# Patient Record
Sex: FEMALE | Race: WHITE | Employment: OTHER | URBAN - METROPOLITAN AREA
[De-identification: names, ages, dates, MRNs, and addresses within clinical notes are randomized per-mention and may not be internally consistent; named-entity substitution may affect disease eponyms.]

---

## 2019-12-02 PROCEDURE — 88305 TISSUE EXAM BY PATHOLOGIST: CPT | Performed by: INTERNAL MEDICINE

## 2024-12-20 DIAGNOSIS — M48.062 SPINAL STENOSIS OF LUMBAR REGION WITH NEUROGENIC CLAUDICATION: ICD-10-CM

## 2024-12-20 DIAGNOSIS — Z01.812 ENCOUNTER FOR PRE-OPERATIVE LABORATORY TESTING: ICD-10-CM

## 2024-12-20 DIAGNOSIS — Z01.818 PRE-OP TESTING: Primary | ICD-10-CM

## 2024-12-26 RX ORDER — ROSUVASTATIN CALCIUM 20 MG/1
20 TABLET, COATED ORAL NIGHTLY
COMMUNITY

## 2024-12-26 RX ORDER — PREDNISONE 20 MG/1
20 TABLET ORAL DAILY
COMMUNITY
Start: 2024-10-18 | End: 2025-01-17

## 2024-12-30 ENCOUNTER — TELEPHONE (OUTPATIENT)
Dept: FAMILY MEDICINE CLINIC | Facility: CLINIC | Age: 69
End: 2024-12-30

## 2024-12-30 DIAGNOSIS — Z01.818 PREOP EXAMINATION: Primary | ICD-10-CM

## 2024-12-30 DIAGNOSIS — Z01.812 PRE-OPERATIVE LABORATORY EXAMINATION: ICD-10-CM

## 2024-12-30 NOTE — TELEPHONE ENCOUNTER
Patient had H&P done with Dr. Swanson 12/20/24.  EKG also done 12/20 and will be scanned in [facility]. UA in labs done 12/20/24.    She is coming in 12/31/24 to get labs and MRSA swab, and chest x-ray done.    Labs- Alk Phos (53), all other labs WNL, MRSA neg  Xray- WNL    Sent to Dr. Swanson on [facility] to review.

## 2024-12-31 ENCOUNTER — LAB ENCOUNTER (OUTPATIENT)
Dept: LAB | Facility: HOSPITAL | Age: 69
End: 2024-12-31
Attending: INTERNAL MEDICINE
Payer: MEDICARE

## 2024-12-31 ENCOUNTER — HOSPITAL ENCOUNTER (OUTPATIENT)
Dept: GENERAL RADIOLOGY | Facility: HOSPITAL | Age: 69
Discharge: HOME | End: 2024-12-31
Attending: INTERNAL MEDICINE
Payer: MEDICARE

## 2024-12-31 DIAGNOSIS — Z01.818 PRE-OP TESTING: ICD-10-CM

## 2024-12-31 DIAGNOSIS — Z01.812 PRE-OPERATIVE LABORATORY EXAMINATION: ICD-10-CM

## 2024-12-31 DIAGNOSIS — Z01.812 ENCOUNTER FOR PRE-OPERATIVE LABORATORY TESTING: ICD-10-CM

## 2024-12-31 DIAGNOSIS — Z01.818 PREOP EXAMINATION: ICD-10-CM

## 2024-12-31 DIAGNOSIS — M48.062 SPINAL STENOSIS OF LUMBAR REGION WITH NEUROGENIC CLAUDICATION: ICD-10-CM

## 2024-12-31 LAB
ALBUMIN SERPL-MCNC: 4.4 G/DL (ref 3.2–4.8)
ALBUMIN/GLOB SERPL: 1.5 {RATIO} (ref 1–2)
ALP LIVER SERPL-CCNC: 53 U/L
ALT SERPL-CCNC: 16 U/L
ANION GAP SERPL CALC-SCNC: 4 MMOL/L (ref 0–18)
ANTIBODY SCREEN: NEGATIVE
AST SERPL-CCNC: 20 U/L (ref ?–34)
BASOPHILS # BLD AUTO: 0.06 X10(3) UL (ref 0–0.2)
BASOPHILS NFR BLD AUTO: 1.1 %
BILIRUB SERPL-MCNC: 0.6 MG/DL (ref 0.2–1.1)
BUN BLD-MCNC: 13 MG/DL (ref 9–23)
BUN/CREAT SERPL: 15.7 (ref 10–20)
CALCIUM BLD-MCNC: 9.7 MG/DL (ref 8.7–10.4)
CHLORIDE SERPL-SCNC: 103 MMOL/L (ref 98–112)
CO2 SERPL-SCNC: 29 MMOL/L (ref 21–32)
CREAT BLD-MCNC: 0.83 MG/DL
CRP SERPL-MCNC: <0.4 MG/DL (ref ?–1)
DEPRECATED RDW RBC AUTO: 42 FL (ref 35.1–46.3)
EGFRCR SERPLBLD CKD-EPI 2021: 76 ML/MIN/1.73M2 (ref 60–?)
EOSINOPHIL # BLD AUTO: 0.18 X10(3) UL (ref 0–0.7)
EOSINOPHIL NFR BLD AUTO: 3.2 %
ERYTHROCYTE [DISTWIDTH] IN BLOOD BY AUTOMATED COUNT: 12 % (ref 11–15)
ERYTHROCYTE [SEDIMENTATION RATE] IN BLOOD: 8 MM/HR
FASTING STATUS PATIENT QL REPORTED: YES
GLOBULIN PLAS-MCNC: 3 G/DL (ref 2–3.5)
GLUCOSE BLD-MCNC: 89 MG/DL (ref 70–99)
HCT VFR BLD AUTO: 38.9 %
HGB BLD-MCNC: 13.3 G/DL
IMM GRANULOCYTES # BLD AUTO: 0.09 X10(3) UL (ref 0–1)
IMM GRANULOCYTES NFR BLD: 1.6 %
LYMPHOCYTES # BLD AUTO: 2.16 X10(3) UL (ref 1–4)
LYMPHOCYTES NFR BLD AUTO: 38.8 %
MCH RBC QN AUTO: 32.4 PG (ref 26–34)
MCHC RBC AUTO-ENTMCNC: 34.2 G/DL (ref 31–37)
MCV RBC AUTO: 94.6 FL
MONOCYTES # BLD AUTO: 0.43 X10(3) UL (ref 0.1–1)
MONOCYTES NFR BLD AUTO: 7.7 %
MRSA DNA SPEC QL NAA+PROBE: NEGATIVE
NEUTROPHILS # BLD AUTO: 2.65 X10 (3) UL (ref 1.5–7.7)
NEUTROPHILS # BLD AUTO: 2.65 X10(3) UL (ref 1.5–7.7)
NEUTROPHILS NFR BLD AUTO: 47.6 %
OSMOLALITY SERPL CALC.SUM OF ELEC: 282 MOSM/KG (ref 275–295)
PLATELET # BLD AUTO: 337 10(3)UL (ref 150–450)
POTASSIUM SERPL-SCNC: 4.3 MMOL/L (ref 3.5–5.1)
PROT SERPL-MCNC: 7.4 G/DL (ref 5.7–8.2)
RBC # BLD AUTO: 4.11 X10(6)UL
RH BLOOD TYPE: POSITIVE
SODIUM SERPL-SCNC: 136 MMOL/L (ref 136–145)
WBC # BLD AUTO: 5.6 X10(3) UL (ref 4–11)

## 2024-12-31 PROCEDURE — 85025 COMPLETE CBC W/AUTO DIFF WBC: CPT

## 2024-12-31 PROCEDURE — 86140 C-REACTIVE PROTEIN: CPT

## 2024-12-31 PROCEDURE — 87641 MR-STAPH DNA AMP PROBE: CPT

## 2024-12-31 PROCEDURE — 85652 RBC SED RATE AUTOMATED: CPT

## 2024-12-31 PROCEDURE — 86901 BLOOD TYPING SEROLOGIC RH(D): CPT | Performed by: INTERNAL MEDICINE

## 2024-12-31 PROCEDURE — 71046 X-RAY EXAM CHEST 2 VIEWS: CPT | Performed by: INTERNAL MEDICINE

## 2024-12-31 PROCEDURE — 80053 COMPREHEN METABOLIC PANEL: CPT

## 2024-12-31 PROCEDURE — 86850 RBC ANTIBODY SCREEN: CPT | Performed by: INTERNAL MEDICINE

## 2024-12-31 PROCEDURE — 86900 BLOOD TYPING SEROLOGIC ABO: CPT | Performed by: INTERNAL MEDICINE

## 2024-12-31 PROCEDURE — 36415 COLL VENOUS BLD VENIPUNCTURE: CPT

## 2025-01-03 NOTE — TELEPHONE ENCOUNTER
Andre Swanson MD  You3 days ago       EKG normal 12/20,  CBC,CMP, Sed rate, CRP normal 12/31    Patient is an acceptable surgical candidate    Andre Swanson MD

## 2025-01-03 NOTE — TELEPHONE ENCOUNTER
PARRISH for patient with test results and let her know she is clear for surgery per Dr. Swanson. Dr. Awad's office notified.

## 2025-01-16 ENCOUNTER — APPOINTMENT (OUTPATIENT)
Dept: GENERAL RADIOLOGY | Facility: HOSPITAL | Age: 70
End: 2025-01-16
Attending: ORTHOPAEDIC SURGERY
Payer: MEDICARE

## 2025-01-16 ENCOUNTER — ANESTHESIA EVENT (OUTPATIENT)
Dept: SURGERY | Facility: HOSPITAL | Age: 70
End: 2025-01-16
Payer: MEDICARE

## 2025-01-16 ENCOUNTER — HOSPITAL ENCOUNTER (INPATIENT)
Facility: HOSPITAL | Age: 70
LOS: 1 days | Discharge: HOME | End: 2025-01-17
Attending: ORTHOPAEDIC SURGERY | Admitting: ORTHOPAEDIC SURGERY
Payer: MEDICARE

## 2025-01-16 ENCOUNTER — ANESTHESIA (OUTPATIENT)
Dept: SURGERY | Facility: HOSPITAL | Age: 70
End: 2025-01-16
Payer: MEDICARE

## 2025-01-16 PROBLEM — E78.5 HYPERLIPIDEMIA: Status: ACTIVE | Noted: 2025-01-16

## 2025-01-16 PROBLEM — Z98.1 S/P LUMBAR FUSION: Status: ACTIVE | Noted: 2025-01-16

## 2025-01-16 PROBLEM — M54.16 LUMBAR RADICULOPATHY: Status: ACTIVE | Noted: 2025-01-16

## 2025-01-16 LAB — RH BLOOD TYPE: POSITIVE

## 2025-01-16 PROCEDURE — 0SG00A0 FUSION OF LUMBAR VERTEBRAL JOINT WITH INTERBODY FUSION DEVICE, ANTERIOR APPROACH, ANTERIOR COLUMN, OPEN APPROACH: ICD-10-PCS | Performed by: ORTHOPAEDIC SURGERY

## 2025-01-16 PROCEDURE — 0SB20ZZ EXCISION OF LUMBAR VERTEBRAL DISC, OPEN APPROACH: ICD-10-PCS | Performed by: ORTHOPAEDIC SURGERY

## 2025-01-16 PROCEDURE — 99222 1ST HOSP IP/OBS MODERATE 55: CPT | Performed by: HOSPITALIST

## 2025-01-16 PROCEDURE — 4A11X4G MONITORING OF PERIPHERAL NERVOUS ELECTRICAL ACTIVITY, INTRAOPERATIVE, EXTERNAL APPROACH: ICD-10-PCS | Performed by: ORTHOPAEDIC SURGERY

## 2025-01-16 PROCEDURE — 76000 FLUOROSCOPY <1 HR PHYS/QHP: CPT | Performed by: ORTHOPAEDIC SURGERY

## 2025-01-16 PROCEDURE — 0SG0071 FUSION OF LUMBAR VERTEBRAL JOINT WITH AUTOLOGOUS TISSUE SUBSTITUTE, POSTERIOR APPROACH, POSTERIOR COLUMN, OPEN APPROACH: ICD-10-PCS | Performed by: ORTHOPAEDIC SURGERY

## 2025-01-16 DEVICE — XLIF AMS PLATE, 8MM SINGLE-SIDED
Type: IMPLANTABLE DEVICE | Site: BACK | Status: FUNCTIONAL
Brand: XLIF

## 2025-01-16 DEVICE — SCREW SPNL 6.5X45MM 2C REDUC POLYAX RELINE: Type: IMPLANTABLE DEVICE | Site: BACK | Status: FUNCTIONAL

## 2025-01-16 DEVICE — GRAFT BNE SUB MTRX C OSTEOCEL PRO: Type: IMPLANTABLE DEVICE | Site: BACK | Status: FUNCTIONAL

## 2025-01-16 DEVICE — K WIRE FIX NIT BVL BLNT TIP PRECEPT: Type: IMPLANTABLE DEVICE | Site: BACK

## 2025-01-16 DEVICE — IMPLANTABLE DEVICE: Type: IMPLANTABLE DEVICE | Site: BACK | Status: FUNCTIONAL

## 2025-01-16 DEVICE — SCREW SPNL 5.5MM LOK OPN TULIP RELINE: Type: IMPLANTABLE DEVICE | Site: BACK | Status: FUNCTIONAL

## 2025-01-16 DEVICE — ROD SPNL 5.5X45MM POST: Type: IMPLANTABLE DEVICE | Site: BACK | Status: FUNCTIONAL

## 2025-01-16 DEVICE — COHERE XL, 8X18X50MM 10°
Type: IMPLANTABLE DEVICE | Site: BACK | Status: FUNCTIONAL
Brand: COHERE

## 2025-01-16 RX ORDER — BISACODYL 10 MG
10 SUPPOSITORY, RECTAL RECTAL
Status: DISCONTINUED | OUTPATIENT
Start: 2025-01-16 | End: 2025-01-17

## 2025-01-16 RX ORDER — MORPHINE SULFATE 4 MG/ML
4 INJECTION, SOLUTION INTRAMUSCULAR; INTRAVENOUS EVERY 10 MIN PRN
Status: DISCONTINUED | OUTPATIENT
Start: 2025-01-16 | End: 2025-01-16 | Stop reason: HOSPADM

## 2025-01-16 RX ORDER — SODIUM CHLORIDE, SODIUM LACTATE, POTASSIUM CHLORIDE, CALCIUM CHLORIDE 600; 310; 30; 20 MG/100ML; MG/100ML; MG/100ML; MG/100ML
INJECTION, SOLUTION INTRAVENOUS CONTINUOUS PRN
Status: DISCONTINUED | OUTPATIENT
Start: 2025-01-16 | End: 2025-01-16 | Stop reason: SURG

## 2025-01-16 RX ORDER — HYDROMORPHONE HYDROCHLORIDE 1 MG/ML
0.4 INJECTION, SOLUTION INTRAMUSCULAR; INTRAVENOUS; SUBCUTANEOUS EVERY 5 MIN PRN
Status: DISCONTINUED | OUTPATIENT
Start: 2025-01-16 | End: 2025-01-16 | Stop reason: HOSPADM

## 2025-01-16 RX ORDER — SODIUM CHLORIDE, SODIUM LACTATE, POTASSIUM CHLORIDE, CALCIUM CHLORIDE 600; 310; 30; 20 MG/100ML; MG/100ML; MG/100ML; MG/100ML
INJECTION, SOLUTION INTRAVENOUS CONTINUOUS
Status: DISCONTINUED | OUTPATIENT
Start: 2025-01-16 | End: 2025-01-16 | Stop reason: HOSPADM

## 2025-01-16 RX ORDER — SODIUM CHLORIDE, SODIUM LACTATE, POTASSIUM CHLORIDE, CALCIUM CHLORIDE 600; 310; 30; 20 MG/100ML; MG/100ML; MG/100ML; MG/100ML
INJECTION, SOLUTION INTRAVENOUS CONTINUOUS
Status: DISCONTINUED | OUTPATIENT
Start: 2025-01-16 | End: 2025-01-17

## 2025-01-16 RX ORDER — MIDAZOLAM HYDROCHLORIDE 1 MG/ML
INJECTION INTRAMUSCULAR; INTRAVENOUS AS NEEDED
Status: DISCONTINUED | OUTPATIENT
Start: 2025-01-16 | End: 2025-01-16 | Stop reason: SURG

## 2025-01-16 RX ORDER — HYDROMORPHONE HYDROCHLORIDE 1 MG/ML
0.6 INJECTION, SOLUTION INTRAMUSCULAR; INTRAVENOUS; SUBCUTANEOUS EVERY 5 MIN PRN
Status: DISCONTINUED | OUTPATIENT
Start: 2025-01-16 | End: 2025-01-16 | Stop reason: HOSPADM

## 2025-01-16 RX ORDER — EPHEDRINE SULFATE 50 MG/ML
INJECTION INTRAVENOUS AS NEEDED
Status: DISCONTINUED | OUTPATIENT
Start: 2025-01-16 | End: 2025-01-16 | Stop reason: SURG

## 2025-01-16 RX ORDER — ROSUVASTATIN CALCIUM 20 MG/1
20 TABLET, COATED ORAL NIGHTLY
Status: DISCONTINUED | OUTPATIENT
Start: 2025-01-16 | End: 2025-01-17

## 2025-01-16 RX ORDER — SCOPOLAMINE 1 MG/3D
PATCH, EXTENDED RELEASE TRANSDERMAL AS NEEDED
Status: DISCONTINUED | OUTPATIENT
Start: 2025-01-16 | End: 2025-01-16 | Stop reason: SURG

## 2025-01-16 RX ORDER — HYDROMORPHONE HYDROCHLORIDE 1 MG/ML
0.2 INJECTION, SOLUTION INTRAMUSCULAR; INTRAVENOUS; SUBCUTANEOUS EVERY 2 HOUR PRN
Status: DISCONTINUED | OUTPATIENT
Start: 2025-01-16 | End: 2025-01-17

## 2025-01-16 RX ORDER — DOCUSATE SODIUM 100 MG/1
100 CAPSULE, LIQUID FILLED ORAL 2 TIMES DAILY
Status: DISCONTINUED | OUTPATIENT
Start: 2025-01-16 | End: 2025-01-17

## 2025-01-16 RX ORDER — METOCLOPRAMIDE HYDROCHLORIDE 5 MG/ML
10 INJECTION INTRAMUSCULAR; INTRAVENOUS EVERY 8 HOURS PRN
Status: DISCONTINUED | OUTPATIENT
Start: 2025-01-16 | End: 2025-01-17

## 2025-01-16 RX ORDER — HYDROMORPHONE HYDROCHLORIDE 1 MG/ML
0.2 INJECTION, SOLUTION INTRAMUSCULAR; INTRAVENOUS; SUBCUTANEOUS EVERY 5 MIN PRN
Status: DISCONTINUED | OUTPATIENT
Start: 2025-01-16 | End: 2025-01-16 | Stop reason: HOSPADM

## 2025-01-16 RX ORDER — OXYCODONE HYDROCHLORIDE 5 MG/1
2.5 TABLET ORAL EVERY 4 HOURS PRN
Status: DISCONTINUED | OUTPATIENT
Start: 2025-01-16 | End: 2025-01-17

## 2025-01-16 RX ORDER — NALOXONE HYDROCHLORIDE 0.4 MG/ML
80 INJECTION, SOLUTION INTRAMUSCULAR; INTRAVENOUS; SUBCUTANEOUS AS NEEDED
Status: DISCONTINUED | OUTPATIENT
Start: 2025-01-16 | End: 2025-01-16 | Stop reason: HOSPADM

## 2025-01-16 RX ORDER — POLYETHYLENE GLYCOL 3350 17 G/17G
17 POWDER, FOR SOLUTION ORAL DAILY PRN
Status: DISCONTINUED | OUTPATIENT
Start: 2025-01-16 | End: 2025-01-17

## 2025-01-16 RX ORDER — ROCURONIUM BROMIDE 10 MG/ML
INJECTION, SOLUTION INTRAVENOUS AS NEEDED
Status: DISCONTINUED | OUTPATIENT
Start: 2025-01-16 | End: 2025-01-16 | Stop reason: SURG

## 2025-01-16 RX ORDER — METOCLOPRAMIDE HYDROCHLORIDE 5 MG/ML
10 INJECTION INTRAMUSCULAR; INTRAVENOUS EVERY 8 HOURS PRN
Status: DISCONTINUED | OUTPATIENT
Start: 2025-01-16 | End: 2025-01-16 | Stop reason: HOSPADM

## 2025-01-16 RX ORDER — DIPHENHYDRAMINE HYDROCHLORIDE 50 MG/ML
25 INJECTION INTRAMUSCULAR; INTRAVENOUS EVERY 4 HOURS PRN
Status: DISCONTINUED | OUTPATIENT
Start: 2025-01-16 | End: 2025-01-17

## 2025-01-16 RX ORDER — DIAZEPAM 2 MG/1
2 TABLET ORAL EVERY 6 HOURS PRN
Status: DISCONTINUED | OUTPATIENT
Start: 2025-01-16 | End: 2025-01-17

## 2025-01-16 RX ORDER — MORPHINE SULFATE 10 MG/ML
6 INJECTION, SOLUTION INTRAMUSCULAR; INTRAVENOUS EVERY 10 MIN PRN
Status: DISCONTINUED | OUTPATIENT
Start: 2025-01-16 | End: 2025-01-16 | Stop reason: HOSPADM

## 2025-01-16 RX ORDER — DIPHENHYDRAMINE HCL 25 MG
25 CAPSULE ORAL EVERY 4 HOURS PRN
Status: DISCONTINUED | OUTPATIENT
Start: 2025-01-16 | End: 2025-01-17

## 2025-01-16 RX ORDER — ONDANSETRON 2 MG/ML
INJECTION INTRAMUSCULAR; INTRAVENOUS AS NEEDED
Status: DISCONTINUED | OUTPATIENT
Start: 2025-01-16 | End: 2025-01-16 | Stop reason: SURG

## 2025-01-16 RX ORDER — SODIUM PHOSPHATE, DIBASIC AND SODIUM PHOSPHATE, MONOBASIC 7; 19 G/230ML; G/230ML
1 ENEMA RECTAL ONCE AS NEEDED
Status: DISCONTINUED | OUTPATIENT
Start: 2025-01-16 | End: 2025-01-17

## 2025-01-16 RX ORDER — FAMOTIDINE 20 MG/1
20 TABLET, FILM COATED ORAL NIGHTLY PRN
Status: DISCONTINUED | OUTPATIENT
Start: 2025-01-16 | End: 2025-01-17

## 2025-01-16 RX ORDER — SENNOSIDES 8.6 MG
17.2 TABLET ORAL NIGHTLY
Status: DISCONTINUED | OUTPATIENT
Start: 2025-01-16 | End: 2025-01-17

## 2025-01-16 RX ORDER — ACETAMINOPHEN 500 MG
1000 TABLET ORAL ONCE
Status: COMPLETED | OUTPATIENT
Start: 2025-01-16 | End: 2025-01-16

## 2025-01-16 RX ORDER — ONDANSETRON 2 MG/ML
4 INJECTION INTRAMUSCULAR; INTRAVENOUS EVERY 6 HOURS PRN
Status: DISCONTINUED | OUTPATIENT
Start: 2025-01-16 | End: 2025-01-16 | Stop reason: HOSPADM

## 2025-01-16 RX ORDER — HYDROMORPHONE HYDROCHLORIDE 1 MG/ML
0.4 INJECTION, SOLUTION INTRAMUSCULAR; INTRAVENOUS; SUBCUTANEOUS EVERY 2 HOUR PRN
Status: DISCONTINUED | OUTPATIENT
Start: 2025-01-16 | End: 2025-01-17

## 2025-01-16 RX ORDER — METHOCARBAMOL 100 MG/ML
1000 INJECTION, SOLUTION INTRAMUSCULAR; INTRAVENOUS ONCE
Status: COMPLETED | OUTPATIENT
Start: 2025-01-16 | End: 2025-01-16

## 2025-01-16 RX ORDER — DEXAMETHASONE SODIUM PHOSPHATE 4 MG/ML
VIAL (ML) INJECTION AS NEEDED
Status: DISCONTINUED | OUTPATIENT
Start: 2025-01-16 | End: 2025-01-16 | Stop reason: SURG

## 2025-01-16 RX ORDER — MORPHINE SULFATE 4 MG/ML
2 INJECTION, SOLUTION INTRAMUSCULAR; INTRAVENOUS EVERY 10 MIN PRN
Status: DISCONTINUED | OUTPATIENT
Start: 2025-01-16 | End: 2025-01-16 | Stop reason: HOSPADM

## 2025-01-16 RX ORDER — LIDOCAINE HYDROCHLORIDE 10 MG/ML
INJECTION, SOLUTION EPIDURAL; INFILTRATION; INTRACAUDAL; PERINEURAL AS NEEDED
Status: DISCONTINUED | OUTPATIENT
Start: 2025-01-16 | End: 2025-01-16 | Stop reason: SURG

## 2025-01-16 RX ORDER — ONDANSETRON 2 MG/ML
4 INJECTION INTRAMUSCULAR; INTRAVENOUS EVERY 6 HOURS PRN
Status: DISCONTINUED | OUTPATIENT
Start: 2025-01-16 | End: 2025-01-17

## 2025-01-16 RX ORDER — PHENYLEPHRINE HCL 10 MG/ML
VIAL (ML) INJECTION AS NEEDED
Status: DISCONTINUED | OUTPATIENT
Start: 2025-01-16 | End: 2025-01-16 | Stop reason: SURG

## 2025-01-16 RX ORDER — BUPIVACAINE HYDROCHLORIDE AND EPINEPHRINE 5; 5 MG/ML; UG/ML
INJECTION, SOLUTION PERINEURAL AS NEEDED
Status: DISCONTINUED | OUTPATIENT
Start: 2025-01-16 | End: 2025-01-16 | Stop reason: HOSPADM

## 2025-01-16 RX ORDER — OXYCODONE HYDROCHLORIDE 5 MG/1
5 TABLET ORAL EVERY 4 HOURS PRN
Status: DISCONTINUED | OUTPATIENT
Start: 2025-01-16 | End: 2025-01-17

## 2025-01-16 RX ADMIN — ROCURONIUM BROMIDE 10 MG: 10 INJECTION, SOLUTION INTRAVENOUS at 12:21:00

## 2025-01-16 RX ADMIN — LIDOCAINE HYDROCHLORIDE 50 MG: 10 INJECTION, SOLUTION EPIDURAL; INFILTRATION; INTRACAUDAL; PERINEURAL at 12:21:00

## 2025-01-16 RX ADMIN — MIDAZOLAM HYDROCHLORIDE 2 MG: 1 INJECTION INTRAMUSCULAR; INTRAVENOUS at 11:56:00

## 2025-01-16 RX ADMIN — ONDANSETRON 4 MG: 2 INJECTION INTRAMUSCULAR; INTRAVENOUS at 12:32:00

## 2025-01-16 RX ADMIN — SODIUM CHLORIDE, SODIUM LACTATE, POTASSIUM CHLORIDE, CALCIUM CHLORIDE: 600; 310; 30; 20 INJECTION, SOLUTION INTRAVENOUS at 12:45:00

## 2025-01-16 RX ADMIN — MIDAZOLAM HYDROCHLORIDE 2 MG: 1 INJECTION INTRAMUSCULAR; INTRAVENOUS at 12:15:00

## 2025-01-16 RX ADMIN — EPHEDRINE SULFATE 10 MG: 50 INJECTION INTRAVENOUS at 14:55:00

## 2025-01-16 RX ADMIN — ONDANSETRON 4 MG: 2 INJECTION INTRAMUSCULAR; INTRAVENOUS at 14:46:00

## 2025-01-16 RX ADMIN — EPHEDRINE SULFATE 10 MG: 50 INJECTION INTRAVENOUS at 13:02:00

## 2025-01-16 RX ADMIN — SCOPOLAMINE 1 PATCH: 1 PATCH, EXTENDED RELEASE TRANSDERMAL at 11:54:00

## 2025-01-16 RX ADMIN — DEXAMETHASONE SODIUM PHOSPHATE 8 MG: 4 MG/ML VIAL (ML) INJECTION at 12:44:00

## 2025-01-16 RX ADMIN — PHENYLEPHRINE HCL 100 MCG: 10 MG/ML VIAL (ML) INJECTION at 13:02:00

## 2025-01-16 RX ADMIN — PHENYLEPHRINE HCL 200 MCG: 10 MG/ML VIAL (ML) INJECTION at 14:55:00

## 2025-01-16 RX ADMIN — SODIUM CHLORIDE, SODIUM LACTATE, POTASSIUM CHLORIDE, CALCIUM CHLORIDE: 600; 310; 30; 20 INJECTION, SOLUTION INTRAVENOUS at 14:51:00

## 2025-01-16 NOTE — ANESTHESIA POSTPROCEDURE EVALUATION
Patient: Kate Franz    Procedure Summary       Date: 01/16/25 Room / Location: [facility] MAIN OR  / [facility] MAIN OR    Anesthesia Start: 1156 Anesthesia Stop: 1527    Procedure: L2 - 3 extreme lateral lumbar interbody fusion with posterior spinal fusion, L2-3 laminectomy (Spine Lumbar) Diagnosis: (Lumbar stenosis, lumbar radiculopathy)    Surgeons: Sedrick Awad MD Anesthesiologist: Rc Becerril MD    Anesthesia Type: general ASA Status: 2            Anesthesia Type: general    Vitals Value Taken Time   /72 01/16/25 1528   Temp 96.9 °F (36.1 °C) 01/16/25 1528   Pulse 85 01/16/25 1528   Resp 16 01/16/25 1528   SpO2 100 % 01/16/25 1528   Vitals shown include unfiled device data.    [facility] AN Post Evaluation:   Patient Evaluated in PACU  Patient Participation: complete - patient participated  Level of Consciousness: awake and responsive to verbal stimuli  Pain Score: 0  Pain Management: adequate  Airway Patency:patent  Yes    Nausea/Vomiting: none  Cardiovascular Status: acceptable, hemodynamically stable and blood pressure returned to baseline  Respiratory Status: acceptable  Postoperative Hydration acceptable      Yoselyn Plaza CRNA  1/16/2025 3:29 PM

## 2025-01-16 NOTE — CONSULTS
[facility]    PATIENT'S NAME: VARSHA HDEZ   ATTENDING PHYSICIAN: Sedrick Awad MD   CONSULTING PHYSICIAN: David Juarez MD   PATIENT ACCOUNT#:   [id number]    LOCATION:  [address] [place] [address]  MEDICAL RECORD #:   [id number]       YOB: 1955  ADMISSION DATE:       01/16/2025      CONSULT DATE:  01/16/2025    REPORT OF CONSULTATION      REASON FOR ADMISSION:  Post lumbar interbody fusion and laminectomy.    HISTORY OF PRESENT ILLNESS:  Patient is a 69-year-old  female with chronic back pain and radiculopathy to the right lower extremity, failed outpatient conservative medical management options.  Known underlying lumbar spondylosis.  She has history of L4-L5 discectomy.  Scheduled today for above-mentioned procedure by her spine orthopedic surgeon, Dr. Awad.  Postoperatively, transferred to PACU for further monitoring.     PAST MEDICAL HISTORY:  Degenerative joint disease of lumbar spine, generalized osteoarthritis.    PAST SURGICAL HISTORY:  Lumbar discectomy at L4-L5, shoulder rotator cuff repair.    MEDICATIONS:  Please see medication reconciliation list.     ALLERGIES:  Etodolac, penicillin, sulfa, Levaquin, and piroxicam.      SOCIAL HISTORY:  No tobacco or drug use.  Social alcohol.  Lives with her family.  Independent for basic activities of daily living.     FAMILY HISTORY:  Father had lung cancer.  Mother had heart disease and hypertension.    REVIEW OF SYSTEMS:  Currently resting in bed.  Back discomfort.  No chest pain.  No shortness of breath.  Other 12-point review of systems is negative.       PHYSICAL EXAMINATION:    GENERAL:  Alert and oriented to time, place and person.  No acute distress.   VITAL SIGNS:  Temperature 96.9, pulse 74, respiratory rate 10, blood pressure 125/73, pulse ox 100% on 2L nasal cannula oxygen.    HEENT:  Atraumatic.  Oropharynx clear.  Moist mucous membranes.  Ears and nose normal.  Eyes:  Anicteric sclerae.   NECK:  Supple.  No  lymphadenopathy.  Trachea midline.  Full range of motion.   LUNGS:  Clear to auscultation bilaterally.  Normal respiratory effort.    HEART:  Regular rate and rhythm.  S1 and S2 auscultated.  No murmur.    ABDOMEN:  Soft, nondistended.  No tenderness.  Positive bowel sounds.   EXTREMITIES:  No peripheral edema, clubbing or cyanosis.   NEUROLOGIC:  Lumbar area dressing noted.    ASSESSMENT AND PLAN:    1.   Lumbar spinal radiculopathy, status post L2-L3 extreme lateral lumbar interbody fusion with posterior spinal fusion, L2-L3 laminectomy.  Pain control.  Neuro checks.  DVT prophylaxis.  Physical and occupational therapy.    2.   Hyperlipidemia.  Continue home medications.    Dictated By David Juarez MD  d: 01/16/2025 16:32:48  t: 01/16/2025 17:10:46  Job [id number]  FB/    cc: Sedrick Awad MD

## 2025-01-16 NOTE — ANESTHESIA PROCEDURE NOTES
Airway  Date/Time: 1/16/2025 12:22 PM  Urgency: Elective      General Information and Staff    Patient location during procedure: OR  Resident/CRNA: Yoselyn Plaza CRNA  Performed: CRNA   Performed by: Yoselyn Plaza CRNA  Authorized by: Rc Becerril MD      Indications and Patient Condition  Indications for airway management: anesthesia  Sedation level: deep  Preoxygenated: yes  Patient position: sniffing  Mask difficulty assessment: 0 - not attempted    Final Airway Details  Final airway type: endotracheal airway      Successful airway: ETT  Cuffed: yes   Successful intubation technique: Video laryngoscopy  Endotracheal tube insertion site: oral  Blade size: #3  ETT size (mm): 7.0    Cormack-Lehane Classification: grade IIA - partial view of glottis  Placement verified by: capnometry   Measured from: lips  ETT to lips (cm): 21  Number of attempts at approach: 1

## 2025-01-16 NOTE — ANESTHESIA PREPROCEDURE EVALUATION
Anesthesia PreOp Note    HPI:     Kate Franz is a 69 year old female who presents for preoperative consultation requested by: Sedrick Awad MD    Date of Surgery: 1/16/2025    Procedure(s):  L2 - 3 extreme lateral lumbar interbody fusion with posterior spinal fusion  Indication: Lumbar stenosis, lumbar radiculopathy    Relevant Problems   No relevant active problems       NPO:  Last Liquid Consumption Date: 01/15/25  Last Liquid Consumption Time: 2100  Last Solid Consumption Date: 01/15/25  Last Solid Consumption Time: 2100  Last Liquid Consumption Date: 01/15/25          History Review:  There are no active problems to display for this patient.      Past Medical History:    Hx of motion sickness    Osteoarthrosis, unspecified whether generalized or localized, unspecified site    PONV (postoperative nausea and vomiting)       Past Surgical History:   Procedure Laterality Date    Back surgery  1991    discectomy L4-5    Colonoscopy,diagnostic  09/26/2015    diffuse diverticulosis, sigmoid polyp    Colonoscopy,remv lesn,snare N/A 09/26/2015    Procedure: COLONOSCOPY, POSSIBLE BIOPSY, POSSIBLE POLYPECTOMY 17165;  Surgeon: Zay Carrasquillo MD;  [facility]    Other surgical history      rotator cuff       Prescriptions Prior to Admission[1]  Current Medications and Prescriptions Ordered in Epic[2]    Allergies[3]    Family History   Problem Relation Age of Onset    Cancer Father         lung    Heart Disorder Mother     Hypertension Mother      Social History     Socioeconomic History    Marital status:    Tobacco Use    Smoking status: Never     Passive exposure: Past    Smokeless tobacco: Never   Vaping Use    Vaping status: Never Used   Substance and Sexual Activity    Alcohol use: Yes     Comment: daily one glass of wine    Drug use: Never       Available pre-op labs reviewed.  Lab Results   Component Value Date    WBC 5.6 12/31/2024    RBC 4.11 12/31/2024    HGB 13.3  12/31/2024    HCT 38.9 12/31/2024    MCV 94.6 12/31/2024    MCH 32.4 12/31/2024    MCHC 34.2 12/31/2024    RDW 12.0 12/31/2024    .0 12/31/2024     Lab Results   Component Value Date     12/31/2024    K 4.3 12/31/2024     12/31/2024    CO2 29.0 12/31/2024    BUN 13 12/31/2024    CREATSERUM 0.83 12/31/2024    GLU 89 12/31/2024    CA 9.7 12/31/2024          Vital Signs:  Body mass index is 23.73 kg/m².   height is 1.676 m (5' 6\") and weight is 66.7 kg (147 lb). Her oral temperature is 97.9 °F (36.6 °C). Her blood pressure is 143/77 and her pulse is 96. Her respiration is 16 and oxygen saturation is 100%.   Vitals:    12/26/24 1019 01/16/25 0959   BP:  143/77   Pulse:  96   Resp:  16   Temp:  97.9 °F (36.6 °C)   TempSrc:  Oral   SpO2:  100%   Weight: 65.8 kg (145 lb) 66.7 kg (147 lb)   Height: 1.676 m (5' 6\")         Anesthesia Evaluation     Patient summary reviewed and Nursing notes reviewed    Airway   Mallampati: II  Dental      Pulmonary - negative ROS and normal exam   Cardiovascular - normal exam  Exercise tolerance: good    NYHA Classification: I    Neuro/Psych - negative ROS     GI/Hepatic/Renal - negative ROS     Endo/Other - negative ROS   Abdominal                  Anesthesia Plan:   ASA:  2  Plan:   General  Airway:  ETT  Post-op Pain Management: IV analgesics      I have informed Kate Vergara Maria M and/or legal guardian or family member of the nature of the anesthetic plan, benefits, risks including possible dental damage if relevant, major complications, and any alternative forms of anesthetic management.   All of the patient's questions were answered to the best of my ability. The patient desires the anesthetic management as planned.  RACHEL PENN MD  1/16/2025 11:37 AM  Present on Admission:  **None**           [1]   Medications Prior to Admission   Medication Sig Dispense Refill Last Dose/Taking    amitriptyline 25 MG Oral Tab Take 1 tablet (25 mg total) by mouth nightly.    1/15/2025 at  9:00 PM    rosuvastatin 20 MG Oral Tab Take 1 tablet (20 mg total) by mouth nightly.   1/15/2025 at  9:00 PM    Acetaminophen (TYLENOL ARTHRITIS PAIN OR) Take 625 mg by mouth in the morning and 625 mg before bedtime. Two in the afternoon and two at night.   1/15/2025 Morning    Oxaprozin 600 MG Oral Tab Take 2 tablets (1,200 mg total) by mouth in the morning and 2 tablets (1,200 mg total) before bedtime.   1/15/2025 Morning    famoTIDine 20 MG Oral Tab Take 1 tablet (20 mg total) by mouth nightly as needed for Heartburn.   1/15/2025 at  9:00 PM    Cholecalciferol (VITAMIN D3 OR) Take 1 tablet by mouth daily.   1/15/2025 at  9:00 PM    predniSONE 20 MG Oral Tab Take 1 tablet (20 mg total) by mouth daily.   More than a month   [2]   Current Facility-Administered Medications Ordered in Epic   Medication Dose Route Frequency Provider Last Rate Last Admin    lactated ringers infusion   Intravenous Continuous Sedrick Awad MD 20 mL/hr at 01/16/25 1021 New Bag at 01/16/25 1021    ceFAZolin (Ancef) 2g in 10mL IV syringe premix  2 g Intravenous Once Sedrick Awad MD         No current [facility]-ordered outpatient medications on file.   [3]   Allergies  Allergen Reactions    Ciprofloxacin OTHER (SEE COMMENTS)     Tendon pain    Etodolac HIVES    Pcn [Penicillins] HIVES     Denies blisters, skin peeling or organ damage    Sulfa Antibiotics HIVES    Codeine NAUSEA AND VOMITING     Patient stated most narcotics cause her nausea and vomiting    Erythromycin NAUSEA AND VOMITING    Levaquin [Levofloxacin] HIVES    Voltaren [Diclofenac] OTHER (SEE COMMENTS)     Chest tightness    Piroxicam RASH

## 2025-01-16 NOTE — H&P
[facility]  part of [facility]    History & Physical    [facility] Debbie Franz Patient Status:  Inpatient    1955 MRN [id number]   Location [facility] PRE OP RECOVERY Attending Sedrick Awad MD   Hosp Day # 0 PCP Andre Swanson MD     Date:  2025  Date of Admission:  2025    History:  Past Medical History:    Hx of motion sickness    Osteoarthrosis, unspecified whether generalized or localized, unspecified site    PONV (postoperative nausea and vomiting)     Past Surgical History:   Procedure Laterality Date    Back surgery      discectomy L4-5    Colonoscopy,diagnostic  2015    diffuse diverticulosis, sigmoid polyp    Colonoscopy,remv lesn,snare N/A 2015    Procedure: COLONOSCOPY, POSSIBLE BIOPSY, POSSIBLE POLYPECTOMY 55316;  Surgeon: Zay Carrasquillo MD;  Location: [facility] [place]    Other surgical history      rotator cuff     Family History   Problem Relation Age of Onset    Cancer Father         lung    Heart Disorder Mother     Hypertension Mother       reports that she has never smoked. She has been exposed to tobacco smoke. She has never used smokeless tobacco. She reports current alcohol use. She reports that she does not use drugs.    Allergies:  Allergies[1]    Home Medications:  Prescriptions Prior to Admission[2]    Review of Systems:  12 point ROS reviewed without pertinent positives.     HPI: The patient presents with complaints of low back pain with radiculopathy. The pain radiates from the low back to the right lateral hip and thigh down the lateral leg to just below the knee in to the anterior proximal shin. She has had a prior right L4-5 microdiscectomy 20 years ago. They deny current fevers, chills, infection, rashes/bruises on the body, gross bowel/bladder incontinence or saddle anesthesia.     Physical Exam:  The patient is well developed, no acute distress, alert and oriented x3, skin intact to the posterior lumbar  without erythema or edema, motor exam with 4/5 right quadriceps/iliopsoas, otherwise 5/5 bilateral lower extremities, calves soft and non tender, 2+DP      Assessment:  Lumbar stenosis  Lumbar radiculopathy  Degenerative scoliosis  Degenerative spondylolisthesis    Plan:  L2-3 XLIF, possible L2-5 right hemilaminotomy      Radha Cabrales PA-C  1/16/2025  11:22 AM         [1]   Allergies  Allergen Reactions    Ciprofloxacin OTHER (SEE COMMENTS)     Tendon pain    Etodolac HIVES    Pcn [Penicillins] HIVES     Denies blisters, skin peeling or organ damage    Sulfa Antibiotics HIVES    Codeine NAUSEA AND VOMITING     Patient stated most narcotics cause her nausea and vomiting    Erythromycin NAUSEA AND VOMITING    Levaquin [Levofloxacin] HIVES    Voltaren [Diclofenac] OTHER (SEE COMMENTS)     Chest tightness    Piroxicam RASH   [2]   Medications Prior to Admission   Medication Sig Dispense Refill Last Dose/Taking    amitriptyline 25 MG Oral Tab Take 1 tablet (25 mg total) by mouth nightly.   1/15/2025 at  9:00 PM    rosuvastatin 20 MG Oral Tab Take 1 tablet (20 mg total) by mouth nightly.   1/15/2025 at  9:00 PM    Acetaminophen (TYLENOL ARTHRITIS PAIN OR) Take 625 mg by mouth in the morning and 625 mg before bedtime. Two in the afternoon and two at night.   1/15/2025 Morning    Oxaprozin 600 MG Oral Tab Take 2 tablets (1,200 mg total) by mouth in the morning and 2 tablets (1,200 mg total) before bedtime.   1/15/2025 Morning    famoTIDine 20 MG Oral Tab Take 1 tablet (20 mg total) by mouth nightly as needed for Heartburn.   1/15/2025 at  9:00 PM    Cholecalciferol (VITAMIN D3 OR) Take 1 tablet by mouth daily.   1/15/2025 at  9:00 PM    predniSONE 20 MG Oral Tab Take 1 tablet (20 mg total) by mouth daily.   More than a month

## 2025-01-17 ENCOUNTER — APPOINTMENT (OUTPATIENT)
Dept: GENERAL RADIOLOGY | Facility: HOSPITAL | Age: 70
End: 2025-01-17
Attending: PHYSICIAN ASSISTANT
Payer: MEDICARE

## 2025-01-17 VITALS
HEIGHT: 66 IN | DIASTOLIC BLOOD PRESSURE: 66 MMHG | TEMPERATURE: 98 F | SYSTOLIC BLOOD PRESSURE: 120 MMHG | WEIGHT: 147 LBS | RESPIRATION RATE: 18 BRPM | HEART RATE: 94 BPM | OXYGEN SATURATION: 96 % | BODY MASS INDEX: 23.63 KG/M2

## 2025-01-17 PROBLEM — M54.16 LUMBAR RADICULOPATHY: Status: RESOLVED | Noted: 2025-01-16 | Resolved: 2025-01-17

## 2025-01-17 PROBLEM — E78.5 HYPERLIPIDEMIA: Status: RESOLVED | Noted: 2025-01-16 | Resolved: 2025-01-17

## 2025-01-17 PROBLEM — Z98.1 S/P LUMBAR FUSION: Status: RESOLVED | Noted: 2025-01-16 | Resolved: 2025-01-17

## 2025-01-17 LAB
DEPRECATED RDW RBC AUTO: 39.8 FL (ref 35.1–46.3)
ERYTHROCYTE [DISTWIDTH] IN BLOOD BY AUTOMATED COUNT: 11.7 % (ref 11–15)
HCT VFR BLD AUTO: 32.7 %
HGB BLD-MCNC: 10.9 G/DL
MCH RBC QN AUTO: 30.7 PG (ref 26–34)
MCHC RBC AUTO-ENTMCNC: 33.3 G/DL (ref 31–37)
MCV RBC AUTO: 92.1 FL
PLATELET # BLD AUTO: 246 10(3)UL (ref 150–450)
RBC # BLD AUTO: 3.55 X10(6)UL
WBC # BLD AUTO: 10.2 X10(3) UL (ref 4–11)

## 2025-01-17 PROCEDURE — 99239 HOSP IP/OBS DSCHRG MGMT >30: CPT | Performed by: INTERNAL MEDICINE

## 2025-01-17 PROCEDURE — 72100 X-RAY EXAM L-S SPINE 2/3 VWS: CPT | Performed by: PHYSICIAN ASSISTANT

## 2025-01-17 RX ORDER — ONDANSETRON 4 MG/1
8 TABLET, ORALLY DISINTEGRATING ORAL ONCE
Status: COMPLETED | OUTPATIENT
Start: 2025-01-17 | End: 2025-01-17

## 2025-01-17 RX ORDER — ONDANSETRON 8 MG/1
8 TABLET, ORALLY DISINTEGRATING ORAL EVERY 12 HOURS PRN
Qty: 10 TABLET | Refills: 1 | Status: SHIPPED | OUTPATIENT
Start: 2025-01-17

## 2025-01-17 NOTE — PLAN OF CARE
Patient A&Ox4. POD1 of back surgery. Gel ice in place. Monitoring VS. Voiding freely. Up with standby assist and a walker. Oxy and flexeril given PRN. Encouraged frequent ambulation and use of incentive spirometer. Fall precautions in place, bed locked in lowest position, call light and personal belongings within reach. Frequent rounding by nursing staff.     Patient cleared by IM, ortho, PT/OT. Going home with extra help. Verified that pain medications are at patient's pharmacy. IV removed, discharge education provided, patient sent home with all personal belongings, and discharge instructions. Addressed additional questions.

## 2025-01-17 NOTE — OCCUPATIONAL THERAPY NOTE
OCCUPATIONAL THERAPY EVALUATION - INPATIENT     Room Number: 435/435-A  Evaluation Date: 1/17/2025  Type of Evaluation: Quick Eval   Presenting Problem: lumbar radiculopathy; s/p L2-3 lumbar interbody fusion and laminectomy  Co-Morbidities: Degenerative joint disease of lumbar spine, generalized osteoarthritis     Physician Order: IP Consult to Occupational Therapy  Reason for Therapy: ADL/IADL Dysfunction and Discharge Planning    OCCUPATIONAL THERAPY ASSESSMENT   Patient is a 69 year old female admitted 1/16/2025 for lumbar radiculopathy; s/p L2-3 lumbar interbody fusion and laminectomy.  Prior to admission, patient's baseline is independent with all ADLs and functional mobility.  Patient is currently functioning near baseline with ADLs, functional transfers, and functional mobility. Pt demonstrated the skills required to return home with support from family. Pt with no loss of balance during session, good safety awareness, and good carryover of education. No anticipated needs at discharge.       PLAN  Patient has been evaluated and presents with no skilled Occupational Therapy needs  at this time.  Patient will be discharged from Occupational Therapy services. Please re-order if a new functional limitation presents during this admission.    OT Device Recommendations: None    OCCUPATIONAL THERAPY MEDICAL/SOCIAL HISTORY   Problem List  Principal Problem:    Lumbar radiculopathy  Active Problems:    Hyperlipidemia    S/P lumbar fusion    HOME SITUATION  Type of Home: Condo  Home Layout: One level; Elevator  Lives With: Alone (pt's dtr or niece will be staying with her)  Toilet and Equipment: Standard height toilet  Shower/Tub and Equipment: Walk-in shower; Other (Comment); Hand-held showerhead (built in bench)  Other Equipment: None  Drives: Yes  Patient Regularly Uses: -- (has rollator, doesn't use as baseline)    Prior Level of [place]: Prior to admission pt was independent with all ADLs and functional  mobility. Pt drives, is part of her [employer], and goes out with friends often. Pt lives alone but has family who will be staying with her at discharge.     SUBJECTIVE  I recently retired and am now doing all the things I haven't been able to do.      OCCUPATIONAL THERAPY EXAMINATION    OBJECTIVE  Precautions: Spine  Fall Risk: Standard fall risk       PAIN ASSESSMENT  Ratin  Location: back  Management Techniques: Activity promotion; Relaxation; Repositioning    COGNITION  Overall Cognitive Status:  WFL - within functional limits    RANGE OF MOTION   Upper extremity ROM is within functional limits     STRENGTH ASSESSMENT  Upper extremity strength is within functional limits     COORDINATION  Gross Motor: WFL   Fine Motor: WFL     ACTIVITIES OF DAILY LIVING ASSESSMENT  AM-PAC ‘6-Clicks’ Inpatient Daily Activity Short Form  How much help from another person does the patient currently need…  -   Putting on and taking off regular lower body clothing?: A Little  -   Bathing (including washing, rinsing, drying)?: A Little  -   Toileting, which includes using toilet, bedpan or urinal? : A Little  -   Putting on and taking off regular upper body clothing?: None  -   Taking care of personal grooming such as brushing teeth?: None  -   Eating meals?: None    AM-PAC Score:  Score: 21  Approx Degree of Impairment: 32.79%  Standardized Score (AM-PAC Scale): 44.27  CMS Modifier (G-Code): CJ    FUNCTIONAL TRANSFER ASSESSMENT  Sit to Stand: Chair  Chair: Stand-by Assist  Simulated toilet transfer: SBA      BALANCE ASSESSMENT  Static Sitting: Independent  Static Standing: Stand-by Assist  Dynamic sitting: SUP  Dynamic Standing: SBA     FUNCTIONAL ADL ASSESSMENT  LB Dressing Seated: Supervision  LB Dressing Standing: Stand-by Assist       Skilled Therapy Provided:   RN cleared pt for participation. Pt received in chair with no visitors present. Pt agreeable to participation in therapy. Pt was instructed on plan of care. Pt was  provided education on spine precautions as well as compensatory strategies for managing ADLs and mobility while maintaining precautions. Specifically discussed bed mobility, toileting, LE dressing, UE dressing, grooming management, smart phone/TV ergonomics, and car transfers. Pt completed all functional tasks with assist levels listed above. Pt able to return demo figure four technique from recliner level to don underwear, pants, and socks. Pt able to independently recall 3/3 spinal precautions and requires no additional cues to maintain during session. Pt provided with \"Back on Track\" handout.         EDUCATION PROVIDED  Patient Education : Role of Occupational Therapy; Plan of Care; Functional Transfer Techniques; Fall Prevention; Surgical Precautions; Posture/Positioning; Proper Body Mechanics; Other (compensatory ADL techniques, adaptive equipment)  Patient's Response to Education: Verbalized Understanding; Returned Demonstration    The patient's Approx Degree of Impairment: 32.79% has been calculated based on documentation in the [facility] '6 clicks' Inpatient Daily Activity Short Form.  Research supports that patients with this level of impairment may benefit from return home.  Final disposition will be made by interdisciplinary medical team.    Patient End of Session: Up in chair;Needs met;Call light within reach;RN aware of session/findings;Hospital anti-slip socks;All patient questions and concerns addressed    Patient Evaluation Complexity Level:   Occupational Profile/Medical History MODERATE - Expanded review of history including review of medical or therapy record   Specific performance deficits impacting engagement in ADL/IADL LOW  1 - 3 performance deficits    Client Assessment/Performance Deficits LOW - No comorbidities nor modifications of tasks    Clinical Decision Making LOW - Analysis of occupational profile, problem-focused assessments, limited treatment options    Overall Complexity LOW     OT  Session Time: 20 minutes  Self-Care Home Management: 20 minutes

## 2025-01-17 NOTE — PLAN OF CARE
Patient A&Ox4. POD0 of back sx. Monitoring VS. Gaytan in place. Has not been oob postop. SCDs. Oxy given for pain PRN. Fall precautions, call light and personal belongings within reach, non-skid socks in place to bilateral feet. Frequent rounding by nursing staff. Plan for PT/OT eval.   X Size Of Lesion In Cm (Optional): 0

## 2025-01-17 NOTE — DISCHARGE SUMMARY
[facility]  part of [facility]    Discharge Summary    Kate Franz Patient Status:  Inpatient    1955 MRN [id number]   Location [facility] 4W/SW/SE Attending Satinder Villagran MD   Hosp Day # 1 PCP Andre Swanson MD     Date of Admission: 2025 Disposition: Final discharge disposition not confirmed     Date of Discharge: 2025    Admitting Diagnosis: Lumbar stenosis, lumbar radiculopathy  S/P lumbar fusion    Hospital Discharge Diagnoses:       Lace+ Score: 22  59-90 High Risk  29-58 Medium Risk  0-28   Low Risk.    TCM Follow-Up Recommendation:  LACE < 29: Low Risk of readmission after discharge from the hospital. No TCM follow-up needed.      Problem List:   Patient Active Problem List   Diagnosis    Hyperlipidemia       Reason for Admission: An elective back surgery    Physical Exam:   General appearance: alert, appears stated age and cooperative  Pulmonary:  clear to auscultation bilaterally  Cardiovascular: S1, S2 normal, no murmur, click, rub or gallop, regular rate and rhythm  Abdominal: soft, non-tender; bowel sounds normal; no masses,  no organomegaly  Extremities: extremities normal, atraumatic, no cyanosis or edema  Psychiatric: calm      History of Present Illness: Patient is a 69-year-old  female with chronic back pain and radiculopathy to the right lower extremity, failed outpatient conservative medical management options. Known underlying lumbar spondylosis. She has history of L4-L5 discectomy. Scheduled today for above-mentioned procedure by her spine orthopedic surgeon, Dr. Awad. Postoperatively, transferred to PACU for further monitoring.     Hospital Course: Patient underwent lumbar interbody fusion with posterior spinal fusion between L2 and L3 and a laminectomy.  Postoperative pain control was well.  She was discharged home on postoperative day 1.    Consultations: Orthopedics    Procedures: Lumbar interbody fusion and  laminectomy    Complications: None    Discharge Condition: Good    Discharge Medications:      Discharge Medications        CONTINUE taking these medications        Instructions Prescription details   amitriptyline 25 MG Tabs  Commonly known as: Elavil      Take 1 tablet (25 mg total) by mouth nightly.   Refills: 0     famotidine 20 MG Tabs  Commonly known as: Pepcid      Take 1 tablet (20 mg total) by mouth nightly as needed for Heartburn.   Refills: 0     rosuvastatin 20 MG Tabs  Commonly known as: Crestor      Take 1 tablet (20 mg total) by mouth nightly.   Refills: 0     TYLENOL ARTHRITIS PAIN OR      Take 625 mg by mouth in the morning and 625 mg before bedtime. Two in the afternoon and two at night.   Refills: 0     VITAMIN D3 OR  Notes to patient: Continue home schedule      Take 1 tablet by mouth daily.   Refills: 0            STOP taking these medications      Oxaprozin 600 MG Tabs        predniSONE 20 MG Tabs  Commonly known as: Deltasone                 Follow up Visits: Follow-up with  in 1 week    Follow up Labs:      Other Discharge Instructions:     Satinder Villagran MD  1/17/2025  3:01 PM    > 35 min

## 2025-01-17 NOTE — PROGRESS NOTES
[facility]  part of [facility]    Progress Note    Kate Franz Patient Status:  Inpatient    1955 MRN [id number]   Location [facility] 4W/SW/SE Attending Sedrick Awad MD   Hosp Day # 1 PCP Andre Swanson MD     SUBJECTIVE:  Interval History: The patient is doing well overall.  Sitting up in a chair this AM. The post operative pain is managed with the current medications.  He/She denies any leg pain.  She denies any fever, chills, gross bowel/bladder incontinence or saddle anesthesia. They also deny calf pain, cramping, chest pain, difficulty breathing or shortness of breath.     Post-Op Day: 1    OBJECTIVE:  Blood pressure 125/74, pulse 77, temperature 97.9 °F (36.6 °C), temperature source Oral, resp. rate 20, height 5' 6\" (1.676 m), weight 147 lb (66.7 kg), SpO2 98%.     Ortho Spine Exam:  Incisional dressings are clean, dry and intact.  Adjacent skin is without erythema or edema.  Motor exam is 4+/5 bilateral iliopsoas, otherwise 5/5 to bilateral lower extremities.  2+ dorsalis pedis and posterior tibialis pulses.  Sensation is intact distally. Calves are soft and non-tender to palpation.      ASSESSMENT/PLAN:    S/P L2-3 XLIF    1) Continue current pain management protocol  2) Plan to discharge home today pending clearance from the medicine/hospitalist team and PT/OT  3) Post operative medications were sent to patient's pharmacy outside of [facility]   4) Plan to have patient remove their dressing on POD#3  5) Follow up in clinic in 2 to 3 weeks, appointment should already be scheduled, please have patient call [phone] to confirm or change date/location.   6) All questions answered by the bedside today     AMBAR AZLDIVAR NP  2025  7:11 AM

## 2025-01-17 NOTE — PHYSICAL THERAPY NOTE
PHYSICAL THERAPY EVALUATION - INPATIENT     Room Number: 435/435-A  Evaluation Date: 1/17/2025  Type of Evaluation: Initial   Physician Order: PT Eval and Treat    Presenting Problem: lumbar radiculopathy  Co-Morbidities : DJD L spine, OA  Reason for Therapy: Mobility Dysfunction and Discharge Planning    PHYSICAL THERAPY ASSESSMENT   Patient is a 69 year old female admitted 1/16/2025 for L2-3 XLIF with post fusion, L2-3 laminectomy.  Prior to admission, patient's baseline is indep with ADLs and mobility. Pt takes 30 min walks daily as able until the last 2 months d/t pain. Patient is currently functioning near baseline with bed mobility, transfers, and gait.  Patient is requiring supervision as a result of the following impairments: pain.  Physical Therapy will continue to follow for duration of hospitalization.    Patient will benefit from continued skilled PT Services for duration of hospitalization, however, given the patient is functioning near baseline level do not anticipate skilled therapy needs at discharge .    PLAN DURING HOSPITALIZATION  Nursing Mobility Recommendation : 1 Assist  PT Device Recommendation: Rolling walker  PT Treatment Plan: Bed mobility;Body mechanics;Endurance;Energy conservation;Patient education;Gait training;Strengthening;Transfer training;Balance training  Rehab Potential : Good  Frequency (Obs): Daily     PHYSICAL THERAPY MEDICAL/SOCIAL HISTORY       Problem List  Principal Problem:    Lumbar radiculopathy  Active Problems:    Hyperlipidemia    S/P lumbar fusion      HOME SITUATION  Type of Home: Condo  Home Layout: One level;Elevator  Stairs to Enter : 0        Stairs to Bedroom: 0         Lives With: Alone (pt's dtr or niece will be staying with her)    Drives: Yes   Patient Regularly Uses:  (has rollator, doesn't use as baseline)         SUBJECTIVE  My muscles feel tight.    PHYSICAL THERAPY EXAMINATION   OBJECTIVE  Precautions: Spine  Fall Risk: Standard fall risk    WEIGHT  BEARING RESTRICTION       PAIN ASSESSMENT  Ratin  Location: R flank  Management Techniques: Activity promotion;Breathing techniques;Body mechanics;Repositioning    COGNITION  Overall Cognitive Status:  WFL - within functional limits    RANGE OF MOTION AND STRENGTH ASSESSMENT  Upper extremity ROM and strength are within functional limits (hx of shoulder surgeries with limitations at baseline)  Lower extremity ROM is within functional limits   Lower extremity strength is within functional limits     BALANCE  Static Sitting: Good  Dynamic Sitting: Fair +  Static Standing: Fair +  Dynamic Standing: Fair -    ADDITIONAL TESTS                                    NEUROLOGICAL FINDINGS                      ACTIVITY TOLERANCE                         O2 WALK       AM-PAC '6-Clicks' INPATIENT SHORT FORM - BASIC MOBILITY  How much difficulty does the patient currently have...  Patient Difficulty: Turning over in bed (including adjusting bedclothes, sheets and blankets)?: A Little   Patient Difficulty: Sitting down on and standing up from a chair with arms (e.g., wheelchair, bedside commode, etc.): A Little   Patient Difficulty: Moving from lying on back to sitting on the side of the bed?: A Little   How much help from another person does the patient currently need...   Help from Another: Moving to and from a bed to a chair (including a wheelchair)?: A Little   Help from Another: Need to walk in hospital room?: A Little   Help from Another: Climbing 3-5 steps with a railing?: A Little     AM-PAC Score:  Raw Score: 18   Approx Degree of Impairment: 46.58%   Standardized Score (AM-PAC Scale): 43.63   CMS Modifier (G-Code): CK    FUNCTIONAL ABILITY STATUS  Functional Mobility/Gait Assessment  Gait Assistance: Supervision  Distance (ft): 400  Assistive Device: Rolling walker  Pattern: Within Functional Limits    Sit to Stand: supervision    Exercise/Education Provided:  Bed mobility  Body mechanics  Gait training  Transfer  training  PT Eval    Skilled Therapy Provided: Pt ok to be seen per RN Nathan. Pt educ in role of PT and PT POC. Pt motivated to participate. Pt limited by pain, but functional. Pt has remote hx of microdiscectomy and has been practicing a modified log roll since that surgery. Pt educ in spinal precautions and log roll with demo, pt endorsed understanding. Pt educ in safe transfers with RW. Pt demo'd safe ambulation, no SOB, pain stayed the same. Pt educ in activity pacing for home. Pt will have support from family and friends at d/c, with niece and dtr staying overnight.    The patient's Approx Degree of Impairment: 46.58% has been calculated based on documentation in the [facility] '6 clicks' Inpatient Basic Mobility Short Form.  Research supports that patients with this level of impairment may benefit from home with HHHPT, however, anticipate home with no services as pt is demo'ing mobility required to access her home and has support from family/friends.  Final disposition will be made by interdisciplinary medical team.    Patient End of Session: Up in chair;Needs met;Call light within reach;RN aware of session/findings;All patient questions and concerns addressed;Hospital anti-slip socks    CURRENT GOALS  Goals to be met by: 1/20/25  Patient Goal Patient's self-stated goal is: go home today   Goal #1 Patient is able to demonstrate supine - sit EOB @ level: modified independent     Goal #1   Current Status    Goal #2 Patient is able to demonstrate transfers Sit to/from Stand at assistance level: modified independent with walker - rolling     Goal #2  Current Status    Goal #3 Patient is able to ambulate 300 feet with assist device: walker - rolling at assistance level: modified independent   Goal #3   Current Status    Goal #4    Goal #4   Current Status    Goal #5 Patient to demonstrate independence with home activity/exercise instructions provided to patient in preparation for discharge.   Goal #5   Current Status     Goal #6    Goal #6  Current Status      Patient Evaluation Complexity Level:  History Low - no personal factors and/or co-morbidities   Examination of body systems Low -  addressing 1-2 elements   Clinical Presentation Low- Stable   Clinical Decision Making  Low Complexity     Gait Training: 15 minutes  Therapeutic Activity:  10 minutes

## 2025-01-17 NOTE — DISCHARGE INSTRUCTIONS
Discharge Instructions Dr. Sedrick Awad  POSTERIOR LUMBAR INTERBODY FUSION (TLIF),  ANTERIOR LUMBAR INTERBODY FUSION (ALIF),  EXTREME LATERAL INTERBODY FUSION (XLIF)  POSTERIOLATERAL FUSION (PLF)    Wound Care  Do not change or remove your initial postoperative dressing for the first 2 days after surgery   unless instructed to do so by Dr. Awad's staff or if it is saturated by drainage. If removed,   please reapply a clean dry dressing over the incision. You may have thin adhesive paper strips   on your incision after surgery--please do NOT remove them. They will be removed at your 2-  week postoperative appointment. You may remove your dressing and shower on the third day   after surgery. Let warm soapy water run over the incisional area. Do not scrub the area.   Gently pat the incisional area completely dry. You may reapply a clean dry dressing over the   incision to prevent irritation from clothing, but this is not required after post op day 3. You are   not allowed to soak your incision in a bathtub, hot tub, or swimming pool until the incision is   completely healed and Dr. Awad or his staff permit these activities. Do not apply antibiotic   gels, ointments (Neosporin or bacitracin), lotions, peroxide or iodine solutions on or around the   Incision.  You may have some swelling and/or clear yellow drainage around your incisional site. This is   normal and may take several weeks to go away. Please leave any superficial scabs alone and let   them fall off on their own.    Immediate Follow Up  If you notice incisional redness or increased swelling, continuous clear drainage or change in   color to the drainage, malodor, significant leg swelling, increased pain and/or a fever greater   than 101.5, you should call our office. If it is after business hours you should present to the   closest emergency room. If you have shortness of breath, chest pain, significant stomach pain   and bloating without a bowel  movement, complete loss of bowel or bladder function please   contact our office and present to your local emergency room as soon as possible.    Post-Operative Instructions - Lumbar  After surgery you may experience pain in or around the region of the incision. Some buttock   and leg pain as well as tingling or numbness may also be present. Initially it may be of greater   intensity than before surgery but will usually subside over time as the healing process occurs.   This discomfort is caused from surgical retraction of tissue as well as inflammation and swelling   of previously compressed nerves.  Early activity after surgery is extremely important to help prevent the complications such as   pneumonia and blood clots. Activity also promotes recovery, relieves muscle stiffness, allows   for development of a well-organized scar, and improves your outcome. Your recovery is an   essential part of the surgical process so please follow the guidelines below to return to your   desired activities as safely as possible.     Week 1  ? Try to get at least 8 hours of sleep each night. A disrupted sleep pattern is common   after discharge from the hospital and will return to normal over time.  ? Avoid bending and twisting at the waist. No bending more than what is required to get   dressed. No twisting more than required to toilet (wipe yourself).  ? No sitting for more than 1 hour at a time. Walk and/or change position before returning   to a sitting position.   ? You may not drive, but you may be driven.  ? Begin a daily walking program with 1-2 blocks initially; schedule a daily time and   increase distance daily. Ideally, we would like you to be up and walking 15   minutes/hour.   ? Eat a balanced high-fiber diet and drink plenty of water.  ? Take medications as prescribed, using narcotics and muscle relaxants only as needed.  ? Do not restart any NSAIDs such as Advil, Motrin, Aleve, ibuprofen, naproxen, diclofenac,    meloxicam or celecoxib for at least 3 months after your fusion surgery.   ? Take stool softeners to help with bowel movements.    Week 2  ? Resume normal rising and retiring schedule but continue to rest throughout the day as   needed.  ? You may not drive, but you may be driven.  ? No lifting of anything weighing more than 10 to 15 pounds.  ? Continue scheduled walking, increasing distance and frequency as tolerated.  ? May resume sexual relations when comfortable between 2 to 4 weeks after surgery.  Please be advised the patient must be in the top position until 6 weeks after surgery.   ? Begin weaning from narcotic pain medications if you have not already.  ? Follow-up in the office as scheduled for further instructions.     Disability  The usual period of recovery for Lumbar Spinal Fusion surgery is 8 to 12 weeks and complete   healing may take from 6 to 9 months. Some patients may return to work sooner than others   depending on their job, response to surgery, and ability to perform other lighter tasks in the   workplace. Physician approval is required prior to returning to work.    Post-Operative Pain Medication Policy  It is Dr. Awad's aim to make you as comfortable as possible after surgery. We realize pain   management is not perfect, and that you will have some discomfort after your operation.   There are several factors that limit our ability to completely eliminate pain after surgery. This   first is that pain medications have side effects. Please be advised that high doses or continued   use of narcotic medications may put you at risk for serious health issues including but not   limited to respiratory depression (decreased ability to breathe normally), hypotension (low   blood pressure), nausea and constipation, generalized itching, urinary retention (inability to   urinate) and abdominal distention.   Dr. Awad will prescribe pain medication for 2-3 weeks after surgery and may refer you out to a    pain management specialist for any narcotic medication requested after this period of time.    Preventing Opioid Addiction  [facility] is committed to protecting our patients from Opioid addiction.   We only prescribe opioids when necessary. Whenever possible, we use less-addictive pain   medication and alternative pain management options. Both high-dosage opioids and lowdosage opioids taken over long periods of time can increase the risk of addiction. We attempt to limit our prescriptions of opioids after surgery as much as possible, which may include lower   dosages of opioid medications or fewer pills. If you have additional questions about Opioids   and their dependency, please contact our clinic.    Contact Information  Please contact Dr. Ritesh Pena’s , at [phone] with any   questions or concerns pertaining to scheduling or other administrative issues.  Please contact Radha Cabrales PA-C, Dr. Awad’s physician assistant, at [phone] with   any medical questions or concerns.     AFTER HOURS EMERGENCY CONTACT: Please dial [phone] and press “0”  for the  to connect you to the orthopaedic fellow or resident on call if you have any   urgent questions or concerns after regular business hours. If you do not hear back from the oncall physician in a timely matter and your urgent matter turns emergent, you should present to   your local emergency room. Please follow-up with Radha Cabrales PA-C the following business   day with an update if you do have to contact the on-call physician or present to your local   emergency room after surgery.     Future Dental Appointments  (3 Months following surgery): Prior to going to the dentist, please notify Dr. Awad and let your   Dentist know that you had a spinal fusion surgery. Have the treating physician prescribe   antibiotics prior to the procedure. The appropriate medications and guidelines are as  follows:  ? Amoxicillin 2 grams 1 hour prior to procedure. If allergic to Penicillin, Clindamycin 600   mg, 1 hour prior to procedure. No second doses are required following the dental   procedure

## 2025-01-20 NOTE — OPERATIVE REPORT
PATIENT  Kate Franz    DATE OF SURGERY  1/16/2025    SURGEON   Sedrick Awad MD    ASSISTANT  MYA Godoy    PREOPERATIVE DIAGNOSIS   Degenerative spondylolisthesis with spinal stenosis L2-3  Lumbar radiculopathy    POSTOPERATIVE DIAGNOSIS   Same     PROCEDURE   1. Anterolateral interbody fusion, L2-3  2. Use of anterior interbody biomechanical cage device, L2-3  3. Anterior spinal instrumentation L2-3  4. Posterior spinal instrumentation with fusion L2-3  5. Lumbar laminectomy 2-3, L3-4  6. Use of allograft  7. Use of fluoroscopy   8. Neuromonitoring    ANESTHESIA   General     COMPLICATIONS   None immediate     ESTIMATED BLOOD LOSS   25 ml     IMPLANTS   Nuvasive reline pedicle screws  Nuvasive XLIF Cohere interbody  Nuvasive anterior lumbar plate    DESCRIPTION OF PROCEDURE     INDICATION   The patient presented to the office history of severe back pain   and radicular symptoms has been nonresponsive to extensive   conservative measures. Radiographic workup revealed   a L2-3 with advanced disk   degeneration, spinal stenosis stenosis. At this time, the patient wished to   proceed with surgery. They understand risks and benefits and   risks of failure to improve, neurologic deterioration, failure of   fusion, need for subsequent surgery.     TECHNIQUE   The patient was brought to the operating room. General   anesthesia with endotracheal intubation was performed. The   patient was positioned in the lateral decubitus position with the   left side up. An axillary roll was well placed. Care was taken to ensure all bony prominences were   well padded. The flank was prepped and draped in the usual   fashion. A surgical timeout was performed. The L2-3 disk level   was marked on the lateral radiograph from the patient's skin.     At this time, an incision of the left mid   point of the lateral aspect of the L2-3 disk was made. This was   continued down to fascia with Bovie dissection. The fascia and   the  abdominal muscles were penetrated with a curved 6, which   was advanced into the retroperitoneal free space towards the   finger in the space. Using deep retractors, the psoas muscle was directly visualized.   Sequential dilators were then advanced   under fluoroscopy onto and through the psoas using live EMG   monitoring and no neural stimulation probe was noted. The   guidewire was then advanced into the disk space. The   retractor was then advanced over the dilators and secured to the operating   Table. The base of the surgical field was then probed with EMG   and no neural stimulation was noted.     The disk was incised with a 15-blade. Diskectomy using   curettes as well as lotus was performed. Meticulous care was   taken to preserve the end plate. A tran was advanced through the contra-lateral annulus to release this. At this point,   a distractors were placed into the disk space and    lordotic cage was decided on. A sled placed into the disk space to preserve the   endplates. The cage was then advanced under biplanar fluoroscopy   into the disk space. Excellent disk space distraction was seated   and good position of the cage was noted. A plate overlying the disk space separate from the interbody was placed and fixated with screws. The retractor was then   removed. Fluoroscopy confirmed that good straight position and   alignment of the spine. The fascia was then approximated with 0   Vicryl sutures, subcutaneous tissue, and skin was closed and   sterile dressings were applied.       The patient was then positioned prone on the Chinedu spinal frame. Care was taken to   ensure bony prominences were well padded. The lower back was   prepped and draped in the usual fashion for surgery and local   anesthetic was infiltrated into the paraspinal area. Under   fluoroscopy, an incision was made immediately lateral to the L2   and L3 pedicles under biplane fluoroscopy, a Jamshidi was   advanced from the lateral  aspect of the pedicle down the pedicle   into the vertebral body. A guidewire was then advanced down the   Jamshidi into the vertebral body under biplanar fluorosopy. This was performed in identical fashion at all pedicles.       At this point, the incision   connecting the guidewire was made. A tubular retractor was   placed down to the level of L2-3. The lamina and medial facet   were exposed and removed with a juan. The ligamentum flavum   was removed piecemeal and the dura was exposed for lumbar decompression. Excellent   hemostaiss was achieved. The facet and pars was decorticated at   L2-3 and bone graft both autograft and allograft was left for posterior spinal fusion.     The lamina at L3-4 was also exposed using a tubular retractor. The lamina and medial facet was removed and the ligamentum was exposed. The ligamentum was removed piecemeal and the dura was exposed for a lumbar decompression.     Pedicle screws were then advanced over the guidewire under fluoroscopy. In   addition, live EMG monitoring was performed as the screws were   Advanced. No neural stimulation was   noted. All pedicle screws were well positioned in the same   fashion. A yaima was then advanced down the connectors   into the heads of the pedicle screws. The set screws   were then fastened securely. The extenders were then removed and   the pedicle screw yaima constructs was seen to be well positioned   on fluoroscopy.     The wound was thoroughly irrigated. Hemostasis was ensured.   The fascia was approximated with an 0 Vicryl suture, subcutaneous   tissue, and skin was then approximated. Sterile dressing   applied. The patient returned to recovery in stable condition.

## (undated) DEVICE — SOLUTION IRRIG 1000ML 0.9% NACL USP BTL

## (undated) DEVICE — KIT SUR ACCS MAXCESS

## (undated) DEVICE — 3M™ STERI-DRAPE™ U-DRAPE 1015: Brand: STERI-DRAPE™

## (undated) DEVICE — KIT DIL FOR EXTRM LUM IF NEUROVISION M5 XLIF

## (undated) DEVICE — GLOVE SUR 7 SENSICARE PI MIC PIP CRM PWD F

## (undated) DEVICE — 3M™ TEGADERM™ TRANSPARENT FILM DRESSING FRAME STYLE, 1626W, 4 IN X 4-3/4 IN (10 CM X 12 CM), 50/CT 4CT/CASE: Brand: 3M™ TEGADERM™

## (undated) DEVICE — NEEDLE NRV STIM BVL TIP INSUL PEDCL ACCS SYS

## (undated) DEVICE — SUT COAT VCRL 2-0 18IN CT-1 ABSRB UD CR 36MM

## (undated) DEVICE — GLOVE SUR 6.5 SENSICARE PI PIP GRN PWD F

## (undated) DEVICE — SUT MCRYL 3-0 18IN PS-2 ABSRB UD 19MM 3/8 CIR

## (undated) DEVICE — PACK ANTERIOR POSTERIOR ADD ON

## (undated) DEVICE — SLIM BODY SKIN STAPLER: Brand: APPOSE ULC

## (undated) DEVICE — GLOVE SUR 7 SENSICARE PI PIP GRN PWD F

## (undated) DEVICE — ANTIBACTERIAL UNDYED BRAIDED (POLYGLACTIN 910), SYNTHETIC ABSORBABLE SUTURE: Brand: COATED VICRYL

## (undated) DEVICE — GOWN,SIRUS,FAB REINF,RAGLAN,L,STERILE: Brand: MEDLINE

## (undated) DEVICE — GLOVE SUR 8.5 SENSICARE PI MIC PIP CRM PWD F

## (undated) DEVICE — TRAY CATH FOLEY 16FR INCLUDE BARDX IC COMPLT CARE

## (undated) DEVICE — PAD,NON-ADHERENT,3X8,STERILE,LF,1/PK: Brand: MEDLINE

## (undated) DEVICE — APPLICATOR PREP 26ML CHG 2% ISO ALC 70%

## (undated) DEVICE — PACK CDS LAMINECTOMY

## (undated) DEVICE — GAUZE,SPONGE,4"X4",12PLY,WOVEN,NS,LF: Brand: MEDLINE

## (undated) DEVICE — KIT HEMSTAT MTRX 8ML PORCINE GEL HUM THROM

## (undated) DEVICE — INSULATED BLADE ELECTRODE;CAUTION: FOR MANUFACTURING, PROCESSING, OR REPACKING.: Brand: EDGE

## (undated) DEVICE — INTENDED USE FOR SURGICAL MARKING ON INTACT SKIN, ALSO PROVIDES A PERMANENT METHOD OF IDENTIFYING OBJECTS IN THE OPERATING ROOM: Brand: WRITESITE® PLUS MINI PREP RESISTANT MARKER

## (undated) DEVICE — COVER,TABLE,60X90,STERILE: Brand: MEDLINE

## (undated) DEVICE — TRAY INSTR PWR NUVASIVE

## (undated) DEVICE — SUT COAT VCRL 2-0 27IN ABSRB UD 26MM CT-2

## (undated) DEVICE — SPONGE 4X4 10PK

## (undated) DEVICE — HANDLE SUR BLU PLAS LT FLX SLIP ON ST DISP

## (undated) DEVICE — MONITORING NEUROPHYSIOLOGICAL

## (undated) DEVICE — ADHESIVE SKIN TOP FOR WND CLSR DERMBND ADV

## (undated) DEVICE — WRAP COOLING BACK W/NO PILLOW

## (undated) DEVICE — GLOVE SUR 6.5 SENSICARE PI MIC PIP CRM PWD F

## (undated) DEVICE — SUT COAT VCRL+ 0 27IN UR-6 ABSRB VLT ANTIBACT

## (undated) DEVICE — 3M™ IOBAN™ 2 ANTIMICROBIAL INCISE DRAPE 6650EZ: Brand: IOBAN™ 2

## (undated) DEVICE — C-ARMOR C-ARM EQUIPMENT COVERS CLEAR STERILE UNIVERSAL FIT 12 PER CASE: Brand: C-ARMOR

## (undated) NOTE — LETTER
Hospital Discharge Documentation    From: [facility] Hospitalist's Office  Phone: [phone]    Patient discharged time/date: 2025  5:23 PM  Patient discharge disposition:  Home or Self Care       Discharge Summary - D/C Summary        Discharge Summary signed by Satinder Villagran MD at 2025  3:41 PM  Version 1 of 1      Author: Satinder Villagran MD Service: Internal Medicine Author Type: Physician    Filed: 2025  3:41 PM Date of Service: 2025  3:01 PM Status: Signed    : Satinder Villagran MD (Physician)         [facility]  part of [facility]    Discharge Summary    Kate Franz Patient Status:  Inpatient    1955 MRN [id number]   Location [facility] 4W/SW/SE Attending Satinder Villagran MD   Hosp Day # 1 PCP Andre Swanson MD     Date of Admission: 2025 Disposition: Final discharge disposition not confirmed     Date of Discharge: 2025    Admitting Diagnosis: Lumbar stenosis, lumbar radiculopathy  S/P lumbar fusion    Hospital Discharge Diagnoses:       Lace+ Score: 22  59-90 High Risk  29-58 Medium Risk  0-28   Low Risk.    TCM Follow-Up Recommendation:  LACE < 29: Low Risk of readmission after discharge from the hospital. No TCM follow-up needed.      Problem List:   Patient Active Problem List   Diagnosis    Hyperlipidemia       Reason for Admission: An elective back surgery    Physical Exam:   General appearance: alert, appears stated age and cooperative  Pulmonary:  clear to auscultation bilaterally  Cardiovascular: S1, S2 normal, no murmur, click, rub or gallop, regular rate and rhythm  Abdominal: soft, non-tender; bowel sounds normal; no masses,  no organomegaly  Extremities: extremities normal, atraumatic, no cyanosis or edema  Psychiatric: calm      History of Present Illness: Patient is a 69-year-old  female with chronic back pain and radiculopathy to the right lower extremity, failed outpatient  conservative medical management options. Known underlying lumbar spondylosis. She has history of L4-L5 discectomy. Scheduled today for above-mentioned procedure by her spine orthopedic surgeon, Dr. Awad. Postoperatively, transferred to PACU for further monitoring.     Hospital Course: Patient underwent lumbar interbody fusion with posterior spinal fusion between L2 and L3 and a laminectomy.  Postoperative pain control was well.  She was discharged home on postoperative day 1.    Consultations: Orthopedics    Procedures: Lumbar interbody fusion and laminectomy    Complications: None    Discharge Condition: Good    Discharge Medications:      Discharge Medications        CONTINUE taking these medications        Instructions Prescription details   amitriptyline 25 MG Tabs  Commonly known as: Elavil      Take 1 tablet (25 mg total) by mouth nightly.   Refills: 0     famotidine 20 MG Tabs  Commonly known as: Pepcid      Take 1 tablet (20 mg total) by mouth nightly as needed for Heartburn.   Refills: 0     rosuvastatin 20 MG Tabs  Commonly known as: Crestor      Take 1 tablet (20 mg total) by mouth nightly.   Refills: 0     TYLENOL ARTHRITIS PAIN OR      Take 625 mg by mouth in the morning and 625 mg before bedtime. Two in the afternoon and two at night.   Refills: 0     VITAMIN D3 OR  Notes to patient: Continue home schedule      Take 1 tablet by mouth daily.   Refills: 0            STOP taking these medications      Oxaprozin 600 MG Tabs        predniSONE 20 MG Tabs  Commonly known as: Deltasone                 Follow up Visits: Follow-up with  in 1 week    Follow up Labs:      Other Discharge Instructions:     Satinder Villagran MD  1/17/2025  3:01 PM    > 35 min     Electronically signed by Satinder Villagran MD on 1/17/2025  3:41 PM